# Patient Record
Sex: MALE | Race: WHITE | NOT HISPANIC OR LATINO | Employment: FULL TIME | URBAN - METROPOLITAN AREA
[De-identification: names, ages, dates, MRNs, and addresses within clinical notes are randomized per-mention and may not be internally consistent; named-entity substitution may affect disease eponyms.]

---

## 2018-05-30 ENCOUNTER — OFFICE VISIT (OUTPATIENT)
Dept: RHEUMATOLOGY | Facility: CLINIC | Age: 48
End: 2018-05-30
Payer: COMMERCIAL

## 2018-05-30 ENCOUNTER — LAB VISIT (OUTPATIENT)
Dept: LAB | Facility: HOSPITAL | Age: 48
End: 2018-05-30
Attending: INTERNAL MEDICINE
Payer: COMMERCIAL

## 2018-05-30 VITALS
HEIGHT: 73 IN | HEART RATE: 59 BPM | TEMPERATURE: 98 F | DIASTOLIC BLOOD PRESSURE: 70 MMHG | BODY MASS INDEX: 29.21 KG/M2 | WEIGHT: 220.38 LBS | SYSTOLIC BLOOD PRESSURE: 102 MMHG

## 2018-05-30 DIAGNOSIS — M54.40 CHRONIC LEFT-SIDED LOW BACK PAIN WITH SCIATICA, SCIATICA LATERALITY UNSPECIFIED: ICD-10-CM

## 2018-05-30 DIAGNOSIS — R50.9 FUO (FEVER OF UNKNOWN ORIGIN): Primary | ICD-10-CM

## 2018-05-30 DIAGNOSIS — Z87.891 HISTORY OF TOBACCO ABUSE: ICD-10-CM

## 2018-05-30 DIAGNOSIS — R50.9 FUO (FEVER OF UNKNOWN ORIGIN): ICD-10-CM

## 2018-05-30 DIAGNOSIS — G89.29 CHRONIC LEFT-SIDED LOW BACK PAIN WITH SCIATICA, SCIATICA LATERALITY UNSPECIFIED: ICD-10-CM

## 2018-05-30 LAB
ALBUMIN SERPL BCP-MCNC: 4.3 G/DL
ALP SERPL-CCNC: 57 U/L
ALT SERPL W/O P-5'-P-CCNC: 18 U/L
ANION GAP SERPL CALC-SCNC: 8 MMOL/L
AST SERPL-CCNC: 24 U/L
BASOPHILS # BLD AUTO: 0.03 K/UL
BASOPHILS NFR BLD: 0.3 %
BILIRUB SERPL-MCNC: 0.9 MG/DL
BUN SERPL-MCNC: 15 MG/DL
C3 SERPL-MCNC: 145 MG/DL
C4 SERPL-MCNC: 34 MG/DL
CALCIUM SERPL-MCNC: 9.9 MG/DL
CCP AB SER IA-ACNC: <0.5 U/ML
CHLORIDE SERPL-SCNC: 103 MMOL/L
CO2 SERPL-SCNC: 26 MMOL/L
CREAT SERPL-MCNC: 0.9 MG/DL
CRP SERPL-MCNC: 2.4 MG/L
DIFFERENTIAL METHOD: ABNORMAL
EOSINOPHIL # BLD AUTO: 0 K/UL
EOSINOPHIL NFR BLD: 0.1 %
ERYTHROCYTE [DISTWIDTH] IN BLOOD BY AUTOMATED COUNT: 14.1 %
ERYTHROCYTE [SEDIMENTATION RATE] IN BLOOD BY WESTERGREN METHOD: 7 MM/HR
EST. GFR  (AFRICAN AMERICAN): >60 ML/MIN/1.73 M^2
EST. GFR  (NON AFRICAN AMERICAN): >60 ML/MIN/1.73 M^2
FERRITIN SERPL-MCNC: 97 NG/ML
GLUCOSE SERPL-MCNC: 99 MG/DL
HBV CORE AB SERPL QL IA: NEGATIVE
HBV SURFACE AB SER-ACNC: NEGATIVE M[IU]/ML
HBV SURFACE AG SERPL QL IA: NEGATIVE
HCT VFR BLD AUTO: 44.9 %
HCV AB SERPL QL IA: NEGATIVE
HGB BLD-MCNC: 14.5 G/DL
HIV 1+2 AB+HIV1 P24 AG SERPL QL IA: NEGATIVE
IGA SERPL-MCNC: 194 MG/DL
IGG SERPL-MCNC: 1338 MG/DL
IGM SERPL-MCNC: 168 MG/DL
IMM GRANULOCYTES # BLD AUTO: 0.04 K/UL
IMM GRANULOCYTES NFR BLD AUTO: 0.5 %
LYMPHOCYTES # BLD AUTO: 1.3 K/UL
LYMPHOCYTES NFR BLD: 14.9 %
MCH RBC QN AUTO: 28.7 PG
MCHC RBC AUTO-ENTMCNC: 32.3 G/DL
MCV RBC AUTO: 89 FL
MONOCYTES # BLD AUTO: 0.2 K/UL
MONOCYTES NFR BLD: 2.7 %
NEUTROPHILS # BLD AUTO: 7.2 K/UL
NEUTROPHILS NFR BLD: 81.5 %
NRBC BLD-RTO: 0 /100 WBC
PLATELET # BLD AUTO: 304 K/UL
PMV BLD AUTO: 8.7 FL
POTASSIUM SERPL-SCNC: 4.3 MMOL/L
PROCALCITONIN SERPL IA-MCNC: <0.02 NG/ML
PROT SERPL-MCNC: 8.1 G/DL
RBC # BLD AUTO: 5.05 M/UL
RHEUMATOID FACT SERPL-ACNC: <10 IU/ML
SODIUM SERPL-SCNC: 137 MMOL/L
WBC # BLD AUTO: 8.79 K/UL

## 2018-05-30 PROCEDURE — 80053 COMPREHEN METABOLIC PANEL: CPT

## 2018-05-30 PROCEDURE — 86235 NUCLEAR ANTIGEN ANTIBODY: CPT

## 2018-05-30 PROCEDURE — 85651 RBC SED RATE NONAUTOMATED: CPT

## 2018-05-30 PROCEDURE — 99205 OFFICE O/P NEW HI 60 MIN: CPT | Mod: S$GLB,,, | Performed by: INTERNAL MEDICINE

## 2018-05-30 PROCEDURE — 82728 ASSAY OF FERRITIN: CPT

## 2018-05-30 PROCEDURE — 86255 FLUORESCENT ANTIBODY SCREEN: CPT | Mod: 91

## 2018-05-30 PROCEDURE — 36415 COLL VENOUS BLD VENIPUNCTURE: CPT

## 2018-05-30 PROCEDURE — 86160 COMPLEMENT ANTIGEN: CPT | Mod: 59

## 2018-05-30 PROCEDURE — 86703 HIV-1/HIV-2 1 RESULT ANTBDY: CPT

## 2018-05-30 PROCEDURE — 86334 IMMUNOFIX E-PHORESIS SERUM: CPT

## 2018-05-30 PROCEDURE — 84145 PROCALCITONIN (PCT): CPT

## 2018-05-30 PROCEDURE — 87340 HEPATITIS B SURFACE AG IA: CPT

## 2018-05-30 PROCEDURE — 99999 PR PBB SHADOW E&M-NEW PATIENT-LVL IV: CPT | Mod: PBBFAC,,, | Performed by: INTERNAL MEDICINE

## 2018-05-30 PROCEDURE — 86592 SYPHILIS TEST NON-TREP QUAL: CPT

## 2018-05-30 PROCEDURE — 86038 ANTINUCLEAR ANTIBODIES: CPT

## 2018-05-30 PROCEDURE — 85025 COMPLETE CBC W/AUTO DIFF WBC: CPT

## 2018-05-30 PROCEDURE — 86334 IMMUNOFIX E-PHORESIS SERUM: CPT | Mod: 26,,, | Performed by: PATHOLOGY

## 2018-05-30 PROCEDURE — 86160 COMPLEMENT ANTIGEN: CPT

## 2018-05-30 PROCEDURE — 86140 C-REACTIVE PROTEIN: CPT

## 2018-05-30 PROCEDURE — 82784 ASSAY IGA/IGD/IGG/IGM EACH: CPT | Mod: 59

## 2018-05-30 PROCEDURE — 86706 HEP B SURFACE ANTIBODY: CPT

## 2018-05-30 PROCEDURE — 83516 IMMUNOASSAY NONANTIBODY: CPT

## 2018-05-30 PROCEDURE — 3008F BODY MASS INDEX DOCD: CPT | Mod: CPTII,S$GLB,, | Performed by: INTERNAL MEDICINE

## 2018-05-30 PROCEDURE — 86431 RHEUMATOID FACTOR QUANT: CPT

## 2018-05-30 PROCEDURE — 83516 IMMUNOASSAY NONANTIBODY: CPT | Mod: 59

## 2018-05-30 PROCEDURE — 86200 CCP ANTIBODY: CPT

## 2018-05-30 PROCEDURE — 86704 HEP B CORE ANTIBODY TOTAL: CPT

## 2018-05-30 PROCEDURE — 84165 PROTEIN E-PHORESIS SERUM: CPT | Mod: 26,,, | Performed by: PATHOLOGY

## 2018-05-30 PROCEDURE — 86803 HEPATITIS C AB TEST: CPT

## 2018-05-30 PROCEDURE — 86480 TB TEST CELL IMMUN MEASURE: CPT

## 2018-05-30 PROCEDURE — 84165 PROTEIN E-PHORESIS SERUM: CPT

## 2018-05-30 RX ORDER — PREDNISONE 5 MG/1
10 TABLET ORAL DAILY
Refills: 2 | COMMUNITY
Start: 2018-05-04

## 2018-05-30 RX ORDER — IBUPROFEN 200 MG
200 TABLET ORAL EVERY 6 HOURS PRN
COMMUNITY

## 2018-05-30 ASSESSMENT — ROUTINE ASSESSMENT OF PATIENT INDEX DATA (RAPID3)
AM STIFFNESS SCORE: 1, YES
WHEN YOU AWAKENED IN THE MORNING OVER THE LAST WEEK, PLEASE INDICATE THE AMOUNT OF TIME IT TAKES UNTIL YOU ARE AS LIMBER AS YOU WILL BE FOR THE DAY: 2 HOURS
MDHAQ FUNCTION SCORE: .1
FATIGUE SCORE: 7
PATIENT GLOBAL ASSESSMENT SCORE: 6
PSYCHOLOGICAL DISTRESS SCORE: 1.1
PAIN SCORE: 0
TOTAL RAPID3 SCORE: 2.11

## 2018-05-30 NOTE — PROGRESS NOTES
"Subjective:       Patient ID: Mahesh Patel is a 47 y.o. male.    Chief Complaint: Disease Management    HPI   47YM with chronic lower back pain here for 2nd opinion for febrile episodes. Pt reports that symptoms started about 2 yrs ago where he started developing febrile episodes Tmax 102.8 and has been having weekly febrile episodes that would last 1.5 days. Associated symptoms include flu like symptoms, fatigue, aching joints, hoarseness of voice, skin rash " erythema on forehead and nasolabial areas", night sweats. With elevated inflammatory markers ESR 62,CRP 10.8 Pt reports hx of 8 yr hx of chronic back pain associated with AM stiffness 1-2 hrs better with movement/walking, no night pain, some relief with ibuprofen  Pt was seen by Rheumatologist Dr Kojo Olivarez in [place] who did further workup and started him on prednisone 10mg daily which he has been taking for the past 1 yr however has still been spiking fevers while on prednisone. Pt reports that he would increase his prednisone dose 10mg-->20mg during the episodes and occasionally might miss work when it gets bad.    Labs obtained 01/2018 showed Negative antismith ab, Rf, CCP ab, normal C4,normal C3, EDILMA, ribosomal p ab, EDILMA, SSA ab,SSB , HLA B27 neg.   11/2017 ESR 16 07/2017 ESR 62, CRP 10.8. CBC wnl. Blood cx 07/2017 showed no growth. UA wnl. 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 Pt reports " "that another co-worker was having similar symptoms.     Mother: Myasthenia Gravis  Brother: Crohns disease    Smoked 1/2pk X 10yrs, quit 20yrs ago. Occasional EtOh use. Denies illicit drug use    No current outpatient prescriptions on file prior to visit.     No current facility-administered medications on file prior to visit.      Review of patient's allergies indicates:   Allergen Reactions    Pcn [penicillins]          Review of Systems   Constitutional: Positive for fatigue and fever. Negative for activity change and appetite change.   HENT: Negative for hearing loss and trouble swallowing.    Eyes: Negative for redness and visual disturbance.   Respiratory: Negative for cough, chest tightness and shortness of breath.    Cardiovascular: Negative for chest pain, palpitations and leg swelling.   Gastrointestinal: Negative for abdominal pain, blood in stool, constipation, diarrhea and vomiting.   Endocrine: Negative for cold intolerance, heat intolerance and polyuria.   Genitourinary: Negative for difficulty urinating, frequency and urgency.   Musculoskeletal: Positive for arthralgias. Negative for gait problem, joint swelling and neck pain.   Skin: Positive for rash. Negative for color change.   Neurological: Positive for numbness. Negative for dizziness and weakness.   Psychiatric/Behavioral: Negative for decreased concentration and sleep disturbance.         Objective:   /70   Pulse (!) 59   Temp 97.7 °F (36.5 °C)   Ht 6' 1" (1.854 m)   Wt 100 kg (220 lb 6.4 oz)   BMI 29.08 kg/m²      Physical Exam   Constitutional: He is oriented to person, place, and time and well-developed, well-nourished, and in no distress.   HENT:   Head: Normocephalic and atraumatic.   Eyes: EOM are normal. Pupils are equal, round, and reactive to light.   Neck: Normal range of motion. Neck supple.   Cardiovascular: Normal rate and regular rhythm.    Pulmonary/Chest: Effort normal and breath sounds normal.   Abdominal: Soft. " Bowel sounds are normal.       Right Side Rheumatological Exam     Examination finds the shoulder, elbow, wrist, knee, 1st PIP, 1st MCP, 2nd PIP, 2nd MCP, 3rd PIP, 3rd MCP, 4th PIP, 4th MCP, 5th PIP and 5th MCP normal.    Shoulder Exam   Tenderness Location: no tenderness    Range of Motion   The patient has normal right shoulder ROM.    Crepitus: negative  Swelling: negative    Knee Exam     Range of Motion   The patient has normal right knee ROM.  Effusion: negative  Warmth: negative    Hip Exam   Tenderness Location: no tenderness    Range of Motion   The patient has normal right hip ROM.    Elbow/Wrist Exam   Tenderness Location: no elbow tenderness and no wrist tenderness    Range of Motion   Elbow   The patient has normal right elbow ROM.    Range of Motion   Wrist   The patient has normal right wrist ROM.    Foot Exam     Range of Motion   The patient has normal right ankle ROM.    Left Side Rheumatological Exam     Examination finds the shoulder, elbow, wrist, knee, 1st PIP, 1st MCP, 2nd PIP, 2nd MCP, 3rd PIP, 3rd MCP, 4th PIP, 4th MCP, 5th PIP and 5th MCP normal.    Shoulder Exam   Tenderness Location: no tenderness    Range of Motion   The patient has normal left shoulder ROM.    Crepitus: negative  Swelling: negative    Knee Exam     Range of Motion   The patient has normal left knee ROM.  Effusion: negative  Warmth: negative    Hip Exam   Tenderness Location: no tenderness    Range of Motion   The patient has normal left hip ROM.    Elbow/Wrist Exam   Tenderness Location: no elbow tenderness and no wrist tenderness    Range of Motion   Elbow   The patient has normal left elbow ROM.    Range of Motion   Wrist   The patient has normal left wrist ROM.    Foot Exam     Range of Motion   The patient has normal left ankle ROM.       Back/Neck Exam   Tenderness Left paramedian tenderness of the Lower L-Spine.      Neurological: He is alert and oriented to person, place, and time.   Skin: Skin is warm and dry.      Psychiatric: Affect normal.   Musculoskeletal: Normal range of motion. He exhibits no edema, tenderness or deformity.   Schober test: normal  No synovitis             Assessment:       1. FUO (fever of unknown origin)    2. Chronic left-sided low back pain with sciatica, sciatica laterality unspecified            Plan:         Mahesh was seen today for disease management.    Diagnoses and all orders for this visit:    FUO (fever of unknown origin)  Unsure etiology for pt's symptoms. Ddx autoimmune disease vs infection vs malignancy vs drugs  Workup done by Rheumatologist negative except for initially elevated inflammatory markers.  Pt started on prednisone for suspected CTD however still with febrile episodes despite initiation of prednisone although reports improvement when episodes occur when he increases the dose of prednisone. Based on previous workup ,does not meet Hetal/Cush criteria for Adult onset Stills disease. No GCA symptoms and would be unusual in a young patient <50yrs.  Periodic fever syndromes is also on differential.   Continue prednisone for now.  Will obtain further workup.   If all workup negative, will refer to ID for further evaluation  -     EDILMA; Future  -     Sjogrens syndrome-A extractable nuclear antibody; Future  -     Myeloperoxidase Antibody (MPO); Future  -     PROTEINASE 3 AUTOANTIBODIES; Future  -     Ferritin; Future  -     Sedimentation rate, manual; Future  -     C-reactive protein; Future  -     C3 complement; Future  -     C4 complement; Future  -     Rheumatoid factor; Future  -     Cyclic citrul peptide antibody, IgG; Future  -     HIV-1 and HIV-2 antibodies; Future  -     Hepatitis B surface antigen; Future  -     HBcAB; Future  -     Hepatitis B surface antibody; Future  -     Hepatitis C antibody; Future  -     Quantiferon Gold TB; Future  -     RPR; Future  -     IMMUNOGLOBULINS (IGG, IGA, IGM) QUANTITATIVE; Future  -     Immunofixation electrophoresis; Future  -      Protein electrophoresis, serum; Future  -     Urinalysis; Future  -     Protein / creatinine ratio, urine; Future  -     ANTI-NEUTROPHILIC CYTOPLASMIC ANTIBODY; Future  -     MRI Sacroiliac Joints W WO Contrast; Future  -     CBC auto differential; Future  -     Comprehensive metabolic panel; Future  -     Procalcitonin; Future  -     Miscellaneous Sendout Test Blood; Future  -     X-Ray Chest PA And Lateral; Future    Chronic left-sided low back pain with sciatica, sciatica laterality unspecified  Pt with some features of inflammatory back pain. Xray SI joint done at outside facility showed minimal degenerative changes in the inferior aspects of the SI joints with marginal osteophyte formation no erosions, however in the setting of febrile episodes. Concerned for spondyloarthropathy, will obtain MRI for further eval.   -     MRI Sacroiliac Joints W WO Contrast; Future    History of tobacco abuse  -     X-Ray Chest PA And Lateral; Future

## 2018-05-30 NOTE — PROGRESS NOTES
I have personally taken the history and examined the patient to verify the fellow's notation, and I agree with the impression and plan stated.   Unusual hx of weekly fevers that last about one day; negative previous workup and on prednisone for a year or more.  Only other clue in hx is back pain that is not clearly inflammatory   Agree with workup and considering persistence of sx if workup still unrevealing then we will ask for [facility] for periodic fever syndrome testing.  Miguel Ángel Kaur MD  Rheumatology  Mobile: [phone]

## 2018-05-31 LAB
ALBUMIN SERPL ELPH-MCNC: 4.56 G/DL
ALPHA1 GLOB SERPL ELPH-MCNC: 0.3 G/DL
ALPHA2 GLOB SERPL ELPH-MCNC: 0.71 G/DL
ANA SER QL IF: NORMAL
ANTI-SSA ANTIBODY: 1.67 EU
ANTI-SSA INTERPRETATION: NEGATIVE
B-GLOBULIN SERPL ELPH-MCNC: 0.9 G/DL
GAMMA GLOB SERPL ELPH-MCNC: 1.33 G/DL
INTERPRETATION SERPL IFE-IMP: NORMAL
PATHOLOGIST INTERPRETATION IFE: NORMAL
PATHOLOGIST INTERPRETATION SPE: NORMAL
PROT SERPL-MCNC: 7.8 G/DL
PROTEINASE3 IGG SER-ACNC: <0.2 U

## 2018-06-01 LAB
ANCA AB TITR SER IF: NORMAL TITER
MYELOPEROXIDASE AB SER-ACNC: 2 UNITS
P-ANCA TITR SER IF: NORMAL TITER
RPR SER QL: NORMAL

## 2018-06-05 LAB
MITOGEN NIL: 8.71 IU/ML
NIL: 0.02 IU/ML
TB ANTIGEN NIL: 0.01 IU/ML
TB ANTIGEN: 0.04 IU/ML
TB GOLD: NEGATIVE

## 2018-06-08 ENCOUNTER — HOSPITAL ENCOUNTER (OUTPATIENT)
Dept: RADIOLOGY | Facility: HOSPITAL | Age: 48
Discharge: HOME | End: 2018-06-08
Attending: INTERNAL MEDICINE
Payer: COMMERCIAL

## 2018-06-08 DIAGNOSIS — G89.29 CHRONIC LEFT-SIDED LOW BACK PAIN WITH SCIATICA, SCIATICA LATERALITY UNSPECIFIED: ICD-10-CM

## 2018-06-08 DIAGNOSIS — Z87.891 HISTORY OF TOBACCO ABUSE: ICD-10-CM

## 2018-06-08 DIAGNOSIS — R50.9 FUO (FEVER OF UNKNOWN ORIGIN): ICD-10-CM

## 2018-06-08 DIAGNOSIS — M54.40 CHRONIC LEFT-SIDED LOW BACK PAIN WITH SCIATICA, SCIATICA LATERALITY UNSPECIFIED: ICD-10-CM

## 2018-06-08 PROCEDURE — 71046 X-RAY EXAM CHEST 2 VIEWS: CPT | Mod: TC,FY

## 2018-06-08 PROCEDURE — A9579 GAD-BASE MR CONTRAST NOS,1ML: HCPCS | Performed by: INTERNAL MEDICINE

## 2018-06-08 PROCEDURE — 71046 X-RAY EXAM CHEST 2 VIEWS: CPT | Mod: 26,,, | Performed by: RADIOLOGY

## 2018-06-08 PROCEDURE — 72197 MRI PELVIS W/O & W/DYE: CPT | Mod: TC

## 2018-06-08 PROCEDURE — 25500020 PHARM REV CODE 255: Performed by: INTERNAL MEDICINE

## 2018-06-08 PROCEDURE — 72197 MRI PELVIS W/O & W/DYE: CPT | Mod: 26,,, | Performed by: RADIOLOGY

## 2018-06-08 RX ADMIN — GADOPENTETATE DIMEGLUMINE 20 ML: 469.01 INJECTION INTRAVENOUS at 09:06

## 2018-06-19 LAB
MISCELLANEOUS TEST NAME: NORMAL
REFERENCE LAB: NORMAL
SPECIMEN TYPE: NORMAL
TEST RESULT: NORMAL

## 2018-06-21 ENCOUNTER — TELEPHONE (OUTPATIENT)
Dept: RHEUMATOLOGY | Facility: CLINIC | Age: 48
End: 2018-06-21

## 2018-06-21 NOTE — TELEPHONE ENCOUNTER
Pt was called regarding lab results, imaging, periodic fever syndrome panel. Spoke to wife about lab results. So far all labs obtained are wnl and periodic fever syndrome panel was negative. MRI SI joint shows no inflammation. Wife reports 1 episode of fever this week although had been afebrile for 1 week. Pt still on prednisone 5mg daily started by previous Rheumatologist. May need to consider other imaging such as PET  to r/o underlying malignancy with fever+ weight loss if not already done.  Will f/u on upcoming appointment.  ( tentative 07/18/18) @ 8.00am

## 2018-06-26 ENCOUNTER — TELEPHONE (OUTPATIENT)
Dept: RHEUMATOLOGY | Facility: CLINIC | Age: 48
End: 2018-06-26

## 2018-06-26 NOTE — TELEPHONE ENCOUNTER
Called and informed patient of the need to reschedule 7/18/18 appointment due to a change in the providor's schedule.  Verbalizes understanding.  Appointment rescheduled to 7/25/18.

## 2021-12-06 ENCOUNTER — PATIENT MESSAGE (OUTPATIENT)
Dept: RESEARCH | Facility: HOSPITAL | Age: 51
End: 2021-12-06
Payer: COMMERCIAL